# Patient Record
Sex: MALE | Race: WHITE | NOT HISPANIC OR LATINO | URBAN - METROPOLITAN AREA
[De-identification: names, ages, dates, MRNs, and addresses within clinical notes are randomized per-mention and may not be internally consistent; named-entity substitution may affect disease eponyms.]

---

## 2019-10-07 NOTE — PROCEDURE NOTE: CLINICAL
PROCEDURE NOTE: Lucentis 0.5mg PFS #1 OS. Diagnosis: Neovascular AMD with Active CNV. Anesthesia: Topical. Prep: Betadine Flush. Prior to injection, risks/benefits/alternatives discussed including but not limited to infection, loss of vision or eye, hemorrhage, cataract, glaucoma, retinal tears or detachment. The patient wished to proceed with treatment. Topical anesthesia was induced with Alcaine. Additional anesthesia was achieved using drop(s) or injection checked above. A drop of Povidone-iodine 5% ophthalmic solution was instilled over the injection site and in the inferior fornix. Betadine prep was performed. A single use prefilled syringe of intravitreal Lucentis 0.5mg/0.05ml was used and excess discarded. The needle was passed 3.0 mm posterior to the limbus in pseudophakic patients, and 3.5 mm posterior to the limbus in phakic patients. The remainder of the Lucentis 0.5mg in the single-use vial was then discarded in a medical waste disposal container. The eye was irrigated with sterile irrigating solution. Patient tolerated the procedure well. There were no complications. Post procedure instructions given. CF vision checked. Injection Time 1059. The patient was instructed to return for re-evaluation in approximately 4-12 weeks depending on his/her condition and was told to call immediately if vision decreases and/or if his/her eye becomes red, painful, and/or light sensitive. The patient was instructed to go to the emergency room or call 911 if unable to reach the doctor within an hour or two of trying or calling. Jose D Morris

## 2019-11-18 NOTE — PROCEDURE NOTE: CLINICAL
PROCEDURE NOTE: Lucentis 0.5mg PFS OS. Diagnosis: Neovascular AMD with Active CNV. Anesthesia: Lidocaine 4%. Prep: Betadine Flush. Prior to injection, risks/benefits/alternatives discussed including but not limited to infection, loss of vision or eye, hemorrhage, cataract, glaucoma, retinal tears or detachment. The patient wished to proceed with treatment. Topical anesthesia was induced with Alcaine. Additional anesthesia was achieved using drop(s) or injection checked above. A drop of Povidone-iodine 5% ophthalmic solution was instilled over the injection site and in the inferior fornix. Betadine prep was performed. A single use prefilled syringe of intravitreal Lucentis 0.5mg/0.05ml was used and excess discarded. The needle was passed 3.0 mm posterior to the limbus in pseudophakic patients, and 3.5 mm posterior to the limbus in phakic patients. The remainder of the Lucentis 0.5mg in the single-use vial was then discarded in a medical waste disposal container. The eye was irrigated with sterile irrigating solution. Patient tolerated the procedure well. There were no complications. Post procedure instructions given. CF vision checked. Injection Time 842A. The patient was instructed to return for re-evaluation in approximately 4-12 weeks depending on his/her condition and was told to call immediately if vision decreases and/or if his/her eye becomes red, painful, and/or light sensitive. The patient was instructed to go to the emergency room or call 911 if unable to reach the doctor within an hour or two of trying or calling. Jaclyn Nicholas

## 2019-12-24 NOTE — PROCEDURE NOTE: CLINICAL
PROCEDURE NOTE: Lucentis 0.5mg PFS OS. Diagnosis: Neovascular AMD with Active CNV. Anesthesia: Topical/Subconjunctival. Prep: Betadine Flush. Prior to injection, risks/benefits/alternatives discussed including but not limited to infection, loss of vision or eye, hemorrhage, cataract, glaucoma, retinal tears or detachment. The patient wished to proceed with treatment. Topical anesthesia was induced with Alcaine. Additional anesthesia was achieved using drop(s) or injection checked above. A drop of Povidone-iodine 5% ophthalmic solution was instilled over the injection site and in the inferior fornix. Betadine prep was performed. A single use prefilled syringe of intravitreal Lucentis 0.5mg/0.05ml was used and excess discarded. The needle was passed 3.0 mm posterior to the limbus in pseudophakic patients, and 3.5 mm posterior to the limbus in phakic patients. Injection Time 8:45 AM. Patient tolerated the procedure well. There were no complications. The eye was irrigated with sterile irrigating solution. Post procedure instructions given. The patient was instructed to use Artificial Tears q.i.d. p.r.n for comfort. The patient was instructed to return for re-evaluation in approximately 4-12 weeks depending on his/her condition and was told to call immediately if vision decreases and/or if his/her eye becomes red, painful, and/or light sensitive. The patient was instructed to go to the emergency room or call 911 if unable to reach the doctor within an hour or two of trying or calling. Amy Hernandez

## 2020-02-10 NOTE — PROCEDURE NOTE: CLINICAL
PROCEDURE NOTE: Focal Laser, Choroid OS. Diagnosis: Neovascular AMD with Active CNV. Anesthesia: Topical. Prior to focal laser, risks/benefits/alternatives to laser discussed including scotoma and loss of vision and patient wished to proceed. A written consent is on file, and the need for today’s laser was discussed and the patient is understanding and wishes to proceed. Spot size: 200* um. Pulse power: 140* mW. Number of pulses: *28. Duration: 80* ms. Procedure Time: 1210PM*. Patient tolerated procedure well. There were no complications. Post procedure instructions given. Patient given office phone number/answering service number and advised to call immediately should there be loss of vision or pain, or should they have any other questions or concerns. Marielena Worthington

## 2020-05-11 NOTE — PATIENT DISCUSSION
Recommended OBSERVATION and continued MONITORING for progression. Body Location Override (Optional - Billing Will Still Be Based On Selected Body Map Location If Applicable): left dorsal middle metacarpophalandeal

## 2020-08-19 ENCOUNTER — OFFICE VISIT (OUTPATIENT)
Dept: URBAN - METROPOLITAN AREA CLINIC 121 | Facility: CLINIC | Age: 80
End: 2020-08-19

## 2020-09-08 ENCOUNTER — NEW REFERRAL (OUTPATIENT)
Dept: URBAN - METROPOLITAN AREA CLINIC 26 | Facility: CLINIC | Age: 80
End: 2020-09-08

## 2020-09-08 VITALS
WEIGHT: 185 LBS | SYSTOLIC BLOOD PRESSURE: 122 MMHG | HEART RATE: 61 BPM | HEIGHT: 70 IN | BODY MASS INDEX: 26.48 KG/M2 | DIASTOLIC BLOOD PRESSURE: 76 MMHG

## 2020-09-08 DIAGNOSIS — H43.393: ICD-10-CM

## 2020-09-08 DIAGNOSIS — H35.373: ICD-10-CM

## 2020-09-08 DIAGNOSIS — H35.363: ICD-10-CM

## 2020-09-08 DIAGNOSIS — D31.31: ICD-10-CM

## 2020-09-08 PROCEDURE — 92250 FUNDUS PHOTOGRAPHY W/I&R: CPT

## 2020-09-08 PROCEDURE — 99204 OFFICE O/P NEW MOD 45 MIN: CPT

## 2020-09-08 PROCEDURE — 92235 FLUORESCEIN ANGRPH MLTIFRAME: CPT

## 2020-09-08 ASSESSMENT — VISUAL ACUITY
OD_SC: 20/50-2
OD_CC: 20/25-1
OS_CC: 20/25-2
OS_SC: 20/60+2

## 2020-09-08 ASSESSMENT — TONOMETRY
OS_IOP_MMHG: 16
OD_IOP_MMHG: 14

## 2020-12-10 ENCOUNTER — FOLLOW UP (OUTPATIENT)
Dept: URBAN - METROPOLITAN AREA CLINIC 26 | Facility: CLINIC | Age: 80
End: 2020-12-10

## 2020-12-10 DIAGNOSIS — D31.31: ICD-10-CM

## 2020-12-10 DIAGNOSIS — H35.342: ICD-10-CM

## 2020-12-10 DIAGNOSIS — H35.363: ICD-10-CM

## 2020-12-10 DIAGNOSIS — H43.393: ICD-10-CM

## 2020-12-10 DIAGNOSIS — H35.373: ICD-10-CM

## 2020-12-10 PROCEDURE — 92250 FUNDUS PHOTOGRAPHY W/I&R: CPT

## 2020-12-10 PROCEDURE — 92014 COMPRE OPH EXAM EST PT 1/>: CPT

## 2020-12-10 ASSESSMENT — TONOMETRY
OS_IOP_MMHG: 12
OD_IOP_MMHG: 11

## 2020-12-10 ASSESSMENT — VISUAL ACUITY
OD_CC: 20/25
OS_CC: 20/25

## 2021-02-08 ENCOUNTER — OFFICE VISIT (OUTPATIENT)
Dept: URBAN - METROPOLITAN AREA CLINIC 63 | Facility: CLINIC | Age: 81
End: 2021-02-08

## 2021-06-10 ENCOUNTER — FOLLOW UP (OUTPATIENT)
Dept: URBAN - METROPOLITAN AREA CLINIC 26 | Facility: CLINIC | Age: 81
End: 2021-06-10

## 2021-06-10 VITALS — HEIGHT: 70 IN | WEIGHT: 181 LBS | BODY MASS INDEX: 25.91 KG/M2

## 2021-06-10 DIAGNOSIS — H35.373: ICD-10-CM

## 2021-06-10 DIAGNOSIS — H35.363: ICD-10-CM

## 2021-06-10 DIAGNOSIS — H43.393: ICD-10-CM

## 2021-06-10 DIAGNOSIS — H35.342: ICD-10-CM

## 2021-06-10 DIAGNOSIS — D31.31: ICD-10-CM

## 2021-06-10 PROCEDURE — 92014 COMPRE OPH EXAM EST PT 1/>: CPT

## 2021-06-10 PROCEDURE — 92250 FUNDUS PHOTOGRAPHY W/I&R: CPT

## 2021-06-10 ASSESSMENT — TONOMETRY
OS_IOP_MMHG: 17
OD_IOP_MMHG: 16

## 2021-06-10 ASSESSMENT — VISUAL ACUITY
OD_CC: 20/30-2
OS_CC: 20/30-1

## 2021-12-09 ENCOUNTER — FOLLOW UP (OUTPATIENT)
Dept: URBAN - METROPOLITAN AREA CLINIC 26 | Facility: CLINIC | Age: 81
End: 2021-12-09

## 2021-12-09 VITALS — WEIGHT: 180 LBS | BODY MASS INDEX: 25.77 KG/M2 | HEIGHT: 70 IN

## 2021-12-09 DIAGNOSIS — H04.123: ICD-10-CM

## 2021-12-09 DIAGNOSIS — D31.31: ICD-10-CM

## 2021-12-09 DIAGNOSIS — H25.13: ICD-10-CM

## 2021-12-09 DIAGNOSIS — H35.342: ICD-10-CM

## 2021-12-09 DIAGNOSIS — H35.363: ICD-10-CM

## 2021-12-09 DIAGNOSIS — H35.373: ICD-10-CM

## 2021-12-09 DIAGNOSIS — H43.393: ICD-10-CM

## 2021-12-09 PROCEDURE — 92014 COMPRE OPH EXAM EST PT 1/>: CPT

## 2021-12-09 PROCEDURE — 92134 CPTRZ OPH DX IMG PST SGM RTA: CPT

## 2021-12-09 PROCEDURE — 92250 FUNDUS PHOTOGRAPHY W/I&R: CPT

## 2021-12-09 ASSESSMENT — TONOMETRY
OS_IOP_MMHG: 18
OD_IOP_MMHG: 15

## 2021-12-09 ASSESSMENT — VISUAL ACUITY
OD_CC: 20/25-2
OS_CC: 20/30-1

## 2021-12-26 NOTE — PATIENT DISCUSSION
No answer. Left message for patient to return call to clinic.  Please inform patient that their COVID testing was positive.  They should expect a phone call from the [facility].    They need to remain on strict home self isolation. It would be appropriate to return to work/school    *once they have been fever free for least 24 hours without the use of fever reducing medications,    *improvement of their symptoms   *and at least 10 days have passed since the symptoms began.      Unless otherwise directed by [facility].     For any worsening symptoms, they need to be evaluated in the Emergency Department, not the Urgent Care     Recommended OBSERVATION and continued MONITORING for progression.

## 2022-06-09 ENCOUNTER — FOLLOW UP (OUTPATIENT)
Dept: URBAN - METROPOLITAN AREA CLINIC 26 | Facility: CLINIC | Age: 82
End: 2022-06-09

## 2022-06-09 DIAGNOSIS — H35.342: ICD-10-CM

## 2022-06-09 DIAGNOSIS — H43.393: ICD-10-CM

## 2022-06-09 DIAGNOSIS — H25.13: ICD-10-CM

## 2022-06-09 DIAGNOSIS — H35.373: ICD-10-CM

## 2022-06-09 DIAGNOSIS — H35.363: ICD-10-CM

## 2022-06-09 DIAGNOSIS — H04.123: ICD-10-CM

## 2022-06-09 DIAGNOSIS — D31.31: ICD-10-CM

## 2022-06-09 PROCEDURE — 92250 FUNDUS PHOTOGRAPHY W/I&R: CPT

## 2022-06-09 PROCEDURE — 92134 CPTRZ OPH DX IMG PST SGM RTA: CPT

## 2022-06-09 PROCEDURE — 76512 OPH US DX B-SCAN: CPT

## 2022-06-09 PROCEDURE — 92014 COMPRE OPH EXAM EST PT 1/>: CPT

## 2022-06-09 ASSESSMENT — VISUAL ACUITY
OS_CC: 20/20-2
OD_CC: 20/25+1

## 2022-06-09 ASSESSMENT — TONOMETRY
OD_IOP_MMHG: 11
OS_IOP_MMHG: 15

## 2022-07-09 ENCOUNTER — TELEPHONE ENCOUNTER (OUTPATIENT)
Dept: URBAN - METROPOLITAN AREA CLINIC 121 | Facility: CLINIC | Age: 82
End: 2022-07-09

## 2022-07-09 RX ORDER — LEUCOVORIN CALCIUM 10 MG/1
TABLET ORAL
Refills: 0 | OUTPATIENT
Start: 2020-12-16 | End: 2021-02-08

## 2022-07-09 RX ORDER — METHOTREXATE 25 MG/ML
INJECTION INTRA-ARTERIAL; INTRAMUSCULAR; INTRATHECAL; INTRAVENOUS
Refills: 0 | OUTPATIENT
Start: 2021-02-05 | End: 2021-02-08

## 2022-07-09 RX ORDER — CARVEDILOL 6.25 MG/1
TABLET, FILM COATED ORAL
Refills: 0 | OUTPATIENT
Start: 2020-12-16 | End: 2021-02-08

## 2022-07-09 RX ORDER — APIXABAN 5 MG/1
TABLET, FILM COATED ORAL
Refills: 0 | OUTPATIENT
Start: 2021-01-26 | End: 2021-02-08

## 2022-07-09 RX ORDER — SILDENAFIL CITRATE 100 MG/1
TABLET ORAL
Refills: 0 | OUTPATIENT
Start: 2021-01-27 | End: 2021-02-08

## 2022-07-09 RX ORDER — SPIRONOLACTONE 25 MG/1
.5 TAB DAILY TABLET, FILM COATED ORAL TAKE AS DIRECTED
Refills: 0 | OUTPATIENT
Start: 2021-02-08 | End: 2021-02-08

## 2022-07-10 ENCOUNTER — TELEPHONE ENCOUNTER (OUTPATIENT)
Dept: URBAN - METROPOLITAN AREA CLINIC 121 | Facility: CLINIC | Age: 82
End: 2022-07-10

## 2022-07-10 RX ORDER — LEUCOVORIN CALCIUM 10 MG/1
TABLET ORAL
Refills: 0 | Status: ACTIVE | COMMUNITY
Start: 2021-02-08

## 2022-07-10 RX ORDER — CARVEDILOL 6.25 MG/1
TABLET, FILM COATED ORAL TWICE A DAY
Refills: 0 | Status: ACTIVE | COMMUNITY
Start: 2021-02-08

## 2022-07-10 RX ORDER — APIXABAN 5 MG/1
TABLET, FILM COATED ORAL TWICE A DAY
Refills: 0 | Status: ACTIVE | COMMUNITY
Start: 2021-02-08

## 2022-07-10 RX ORDER — SILDENAFIL CITRATE 100 MG/1
TABLET ORAL AS NEEDED
Refills: 0 | Status: ACTIVE | COMMUNITY
Start: 2021-02-08

## 2022-07-10 RX ORDER — SPIRONOLACTONE 25 MG/1
TABLET, FILM COATED ORAL ONCE A DAY
Refills: 0 | Status: ACTIVE | COMMUNITY
Start: 2021-02-08

## 2022-07-10 RX ORDER — LOSARTAN POTASSIUM 25 MG/1
12.5 TAB PO DAILY TABLET, FILM COATED ORAL TAKE AS DIRECTED
Refills: 0 | Status: ACTIVE | COMMUNITY
Start: 2021-02-08

## 2022-07-10 RX ORDER — CHOLECALCIFEROL (VITAMIN D3) 25 MCG
TABLET ORAL ONCE A DAY
Refills: 0 | Status: ACTIVE | COMMUNITY
Start: 2021-02-08

## 2022-07-10 RX ORDER — MULTIVIT-MIN/FA/LYCOPEN/LUTEIN .4-300-25
TABLET ORAL ONCE A DAY
Refills: 0 | Status: ACTIVE | COMMUNITY
Start: 2021-02-08

## 2022-07-10 RX ORDER — METHOTREXATE 25 MG/ML
INJECTION, SOLUTION INTRA-ARTERIAL; INTRAMUSCULAR; INTRAVENOUS
Refills: 0 | Status: ACTIVE | COMMUNITY
Start: 2021-02-08

## 2022-07-10 RX ORDER — FOLIC ACID 1 MG/1
TABLET ORAL ONCE A DAY
Refills: 0 | Status: ACTIVE | COMMUNITY
Start: 2021-02-08

## 2023-01-12 ENCOUNTER — FOLLOW UP (OUTPATIENT)
Dept: URBAN - METROPOLITAN AREA CLINIC 26 | Facility: CLINIC | Age: 83
End: 2023-01-12

## 2023-01-12 VITALS — HEIGHT: 70 IN | BODY MASS INDEX: 26.92 KG/M2 | WEIGHT: 188 LBS

## 2023-01-12 DIAGNOSIS — D31.31: ICD-10-CM

## 2023-01-12 DIAGNOSIS — H35.342: ICD-10-CM

## 2023-01-12 DIAGNOSIS — H35.363: ICD-10-CM

## 2023-01-12 DIAGNOSIS — H43.393: ICD-10-CM

## 2023-01-12 DIAGNOSIS — H04.123: ICD-10-CM

## 2023-01-12 DIAGNOSIS — H25.13: ICD-10-CM

## 2023-01-12 DIAGNOSIS — H35.373: ICD-10-CM

## 2023-01-12 PROCEDURE — 92250 FUNDUS PHOTOGRAPHY W/I&R: CPT

## 2023-01-12 PROCEDURE — 92134 CPTRZ OPH DX IMG PST SGM RTA: CPT

## 2023-01-12 PROCEDURE — 92014 COMPRE OPH EXAM EST PT 1/>: CPT

## 2023-01-12 PROCEDURE — 76512 OPH US DX B-SCAN: CPT

## 2023-01-12 ASSESSMENT — VISUAL ACUITY
OS_CC: 20/30-2
OD_CC: 20/25-1
OS_PH: 20/30+1

## 2023-01-12 ASSESSMENT — TONOMETRY
OD_IOP_MMHG: 11
OS_IOP_MMHG: 11

## 2023-07-21 ENCOUNTER — FOLLOW UP (OUTPATIENT)
Dept: URBAN - METROPOLITAN AREA CLINIC 26 | Facility: CLINIC | Age: 83
End: 2023-07-21

## 2023-07-21 DIAGNOSIS — H35.3131: ICD-10-CM

## 2023-07-21 DIAGNOSIS — H43.393: ICD-10-CM

## 2023-07-21 DIAGNOSIS — H35.342: ICD-10-CM

## 2023-07-21 DIAGNOSIS — H35.373: ICD-10-CM

## 2023-07-21 DIAGNOSIS — H04.123: ICD-10-CM

## 2023-07-21 DIAGNOSIS — D31.31: ICD-10-CM

## 2023-07-21 DIAGNOSIS — H25.13: ICD-10-CM

## 2023-07-21 PROCEDURE — 92014 COMPRE OPH EXAM EST PT 1/>: CPT

## 2023-07-21 PROCEDURE — 92250 FUNDUS PHOTOGRAPHY W/I&R: CPT

## 2023-07-21 PROCEDURE — 76512 OPH US DX B-SCAN: CPT

## 2023-07-21 PROCEDURE — 92134 CPTRZ OPH DX IMG PST SGM RTA: CPT

## 2023-07-21 ASSESSMENT — TONOMETRY
OS_IOP_MMHG: 12
OD_IOP_MMHG: 15

## 2023-07-21 ASSESSMENT — VISUAL ACUITY
OD_CC: 20/25-1
OS_CC: 20/25-2

## 2024-03-07 ENCOUNTER — FOLLOW UP (OUTPATIENT)
Dept: URBAN - METROPOLITAN AREA CLINIC 26 | Facility: CLINIC | Age: 84
End: 2024-03-07

## 2024-03-07 VITALS — BODY MASS INDEX: 27.2 KG/M2 | WEIGHT: 190 LBS | HEIGHT: 70 IN

## 2024-03-07 DIAGNOSIS — H25.13: ICD-10-CM

## 2024-03-07 DIAGNOSIS — H35.373: ICD-10-CM

## 2024-03-07 DIAGNOSIS — H43.393: ICD-10-CM

## 2024-03-07 DIAGNOSIS — H04.123: ICD-10-CM

## 2024-03-07 DIAGNOSIS — D31.31: ICD-10-CM

## 2024-03-07 DIAGNOSIS — H35.3131: ICD-10-CM

## 2024-03-07 DIAGNOSIS — H35.342: ICD-10-CM

## 2024-03-07 PROCEDURE — 76512 OPH US DX B-SCAN: CPT

## 2024-03-07 PROCEDURE — 92134 CPTRZ OPH DX IMG PST SGM RTA: CPT

## 2024-03-07 PROCEDURE — 92250 FUNDUS PHOTOGRAPHY W/I&R: CPT

## 2024-03-07 PROCEDURE — 92014 COMPRE OPH EXAM EST PT 1/>: CPT

## 2024-03-07 ASSESSMENT — VISUAL ACUITY
OS_CC: 20/30-2
OD_PH: 20/30
OD_CC: 20/40

## 2024-03-07 ASSESSMENT — TONOMETRY
OS_IOP_MMHG: 13
OD_IOP_MMHG: 12

## 2025-03-05 ENCOUNTER — COMPREHENSIVE EXAM (OUTPATIENT)
Age: 85
End: 2025-03-05

## 2025-03-05 VITALS — BODY MASS INDEX: 27.92 KG/M2 | HEIGHT: 70 IN | WEIGHT: 195 LBS

## 2025-03-05 DIAGNOSIS — H43.812: ICD-10-CM

## 2025-03-05 DIAGNOSIS — H35.3131: ICD-10-CM

## 2025-03-05 DIAGNOSIS — H35.342: ICD-10-CM

## 2025-03-05 DIAGNOSIS — D31.31: ICD-10-CM

## 2025-03-05 DIAGNOSIS — H35.373: ICD-10-CM

## 2025-03-05 DIAGNOSIS — H04.123: ICD-10-CM

## 2025-03-05 DIAGNOSIS — H25.13: ICD-10-CM

## 2025-03-05 DIAGNOSIS — H43.393: ICD-10-CM

## 2025-03-05 PROCEDURE — 92250 FUNDUS PHOTOGRAPHY W/I&R: CPT | Mod: 59

## 2025-03-05 PROCEDURE — 76512 OPH US DX B-SCAN: CPT

## 2025-03-05 PROCEDURE — 92014 COMPRE OPH EXAM EST PT 1/>: CPT

## 2025-03-05 PROCEDURE — 92134 CPTRZ OPH DX IMG PST SGM RTA: CPT
